# Patient Record
Sex: MALE | Race: WHITE | NOT HISPANIC OR LATINO
[De-identification: names, ages, dates, MRNs, and addresses within clinical notes are randomized per-mention and may not be internally consistent; named-entity substitution may affect disease eponyms.]

---

## 2021-10-25 ENCOUNTER — TRANSCRIPTION ENCOUNTER (OUTPATIENT)
Age: 51
End: 2021-10-25

## 2021-12-11 ENCOUNTER — TRANSCRIPTION ENCOUNTER (OUTPATIENT)
Age: 51
End: 2021-12-11

## 2021-12-20 ENCOUNTER — TRANSCRIPTION ENCOUNTER (OUTPATIENT)
Age: 51
End: 2021-12-20

## 2023-08-22 ENCOUNTER — APPOINTMENT (OUTPATIENT)
Dept: ORTHOPEDIC SURGERY | Facility: CLINIC | Age: 53
End: 2023-08-22
Payer: COMMERCIAL

## 2023-08-22 ENCOUNTER — APPOINTMENT (OUTPATIENT)
Dept: ORTHOPEDIC SURGERY | Facility: CLINIC | Age: 53
End: 2023-08-22

## 2023-08-22 VITALS — BODY MASS INDEX: 26 KG/M2 | HEIGHT: 76.75 IN | WEIGHT: 218 LBS

## 2023-08-22 DIAGNOSIS — Z78.9 OTHER SPECIFIED HEALTH STATUS: ICD-10-CM

## 2023-08-22 DIAGNOSIS — M25.551 PAIN IN RIGHT HIP: ICD-10-CM

## 2023-08-22 DIAGNOSIS — M54.16 RADICULOPATHY, LUMBAR REGION: ICD-10-CM

## 2023-08-22 PROCEDURE — 99214 OFFICE O/P EST MOD 30 MIN: CPT

## 2023-08-22 PROCEDURE — 73503 X-RAY EXAM HIP UNI 4/> VIEWS: CPT | Mod: RT

## 2023-08-22 RX ORDER — MELOXICAM 15 MG/1
15 TABLET ORAL
Qty: 30 | Refills: 2 | Status: ACTIVE | COMMUNITY
Start: 2023-08-22 | End: 1900-01-01

## 2023-08-22 NOTE — ASSESSMENT
[FreeTextEntry1] : 52 year M WITH MODERATE RT HIP PAIN. PAIN IS TO THE LATERAL ASPECT OF THE HIP. H/O LUMBAR ISSUES AND IS UNDER THE CARE OF NEUROLOGY. HAS HAD SEVERAL EPIDURALS AND TRIGGER POINT INJECTIONS IN THE PAST. PAIN WORSENS WITH PROLONGED SITTING, LYING. PAIN WAKES HIM UP AT NIGHT. HIP XRAYS REVIEWED AS NORMAL. TREATMENT OPTIONS REVIEWED. LUMBAR PT RX. IF SYMPTOMS PERSIST WILL FOLLOW UP WITH PAIN MANAGEMENT. MOBIC RX.

## 2023-08-22 NOTE — IMAGING
[Right] : right hip with pelvis [All Views] : anteroposterior, lateral [There are no fractures, subluxations or dislocations. No significant abnormalities are seen] : There are no fractures, subluxations or dislocations. No significant abnormalities are seen [FreeTextEntry9] : LUMBAR OA

## 2023-08-22 NOTE — HISTORY OF PRESENT ILLNESS
[2] : 2 [3] : 3 [Localized] : localized [Throbbing] : throbbing [Occasional] : occasional [Household chores] : household chores [Leisure] : leisure [Sleep] : sleep [Meds] : meds [Physical therapy] : physical therapy [Sitting] : sitting [Bending forward] : bending forward [Lying in bed] : lying in bed [Full time] : Work status: full time [de-identified] : 8/22/23 Pt is here for right hip pain for 3 months. No known injury. No prior injuries or surgeries to the right hip. No prior Tx.  Pt sees a neurologist for his back and is doing PT for that issue and his right hip.  [] : no [FreeTextEntry1] : RT hip [FreeTextEntry9] : Advil, CBD cream [de-identified] : Diogo

## 2023-10-27 ENCOUNTER — NON-APPOINTMENT (OUTPATIENT)
Age: 53
End: 2023-10-27

## 2023-12-20 ENCOUNTER — NON-APPOINTMENT (OUTPATIENT)
Age: 53
End: 2023-12-20

## 2024-03-07 ENCOUNTER — NON-APPOINTMENT (OUTPATIENT)
Age: 54
End: 2024-03-07

## 2024-12-17 ENCOUNTER — APPOINTMENT (OUTPATIENT)
Dept: ORTHOPEDIC SURGERY | Facility: CLINIC | Age: 54
End: 2024-12-17

## 2025-02-04 ENCOUNTER — APPOINTMENT (OUTPATIENT)
Dept: ORTHOPEDIC SURGERY | Facility: CLINIC | Age: 55
End: 2025-02-04

## 2025-03-05 ENCOUNTER — NON-APPOINTMENT (OUTPATIENT)
Age: 55
End: 2025-03-05

## 2025-03-27 ENCOUNTER — APPOINTMENT (OUTPATIENT)
Dept: ORTHOPEDIC SURGERY | Facility: CLINIC | Age: 55
End: 2025-03-27

## 2025-05-13 ENCOUNTER — APPOINTMENT (OUTPATIENT)
Dept: ORTHOPEDIC SURGERY | Facility: CLINIC | Age: 55
End: 2025-05-13
Payer: COMMERCIAL

## 2025-05-13 VITALS — WEIGHT: 218 LBS | BODY MASS INDEX: 26 KG/M2 | HEIGHT: 76.75 IN

## 2025-05-13 DIAGNOSIS — M17.11 UNILATERAL PRIMARY OSTEOARTHRITIS, RIGHT KNEE: ICD-10-CM

## 2025-05-13 PROCEDURE — 99214 OFFICE O/P EST MOD 30 MIN: CPT

## 2025-05-13 PROCEDURE — G2211 COMPLEX E/M VISIT ADD ON: CPT | Mod: NC

## 2025-05-13 RX ORDER — MELOXICAM 15 MG/1
15 TABLET ORAL
Qty: 30 | Refills: 2 | Status: ACTIVE | COMMUNITY
Start: 2025-05-13 | End: 1900-01-01

## 2025-05-15 ENCOUNTER — APPOINTMENT (OUTPATIENT)
Dept: ORTHOPEDIC SURGERY | Facility: CLINIC | Age: 55
End: 2025-05-15